# Patient Record
Sex: MALE | URBAN - METROPOLITAN AREA
[De-identification: names, ages, dates, MRNs, and addresses within clinical notes are randomized per-mention and may not be internally consistent; named-entity substitution may affect disease eponyms.]

---

## 2019-06-24 ENCOUNTER — TRANSFERRED RECORDS (OUTPATIENT)
Dept: HEALTH INFORMATION MANAGEMENT | Facility: CLINIC | Age: 54
End: 2019-06-24

## 2019-07-03 ENCOUNTER — APPOINTMENT (OUTPATIENT)
Age: 54
Setting detail: DERMATOLOGY
End: 2019-07-03

## 2019-07-03 VITALS — RESPIRATION RATE: 16 BRPM | WEIGHT: 315 LBS | HEIGHT: 60 IN

## 2019-07-03 DIAGNOSIS — L30.4 ERYTHEMA INTERTRIGO: ICD-10-CM

## 2019-07-03 DIAGNOSIS — L83 ACANTHOSIS NIGRICANS: ICD-10-CM

## 2019-07-03 DIAGNOSIS — L74.510 PRIMARY FOCAL HYPERHIDROSIS, AXILLA: ICD-10-CM

## 2019-07-03 PROCEDURE — 99202 OFFICE O/P NEW SF 15 MIN: CPT

## 2019-07-03 PROCEDURE — OTHER PRESCRIPTION: OTHER

## 2019-07-03 PROCEDURE — OTHER COUNSELING: OTHER

## 2019-07-03 RX ORDER — NYSTATIN 100000 1/G
POWDER TOPICAL QD-BID
Qty: 2 | Refills: 11 | Status: ERX | COMMUNITY
Start: 2019-07-03

## 2019-07-03 ASSESSMENT — LOCATION ZONE DERM: LOCATION ZONE: AXILLAE

## 2019-07-03 ASSESSMENT — LOCATION SIMPLE DESCRIPTION DERM
LOCATION SIMPLE: RIGHT AXILLARY VAULT
LOCATION SIMPLE: LEFT AXILLARY VAULT

## 2019-07-03 ASSESSMENT — LOCATION DETAILED DESCRIPTION DERM
LOCATION DETAILED: LEFT AXILLARY VAULT
LOCATION DETAILED: RIGHT AXILLARY VAULT

## 2019-07-03 NOTE — HPI: SWEATING (HYPERHIDROSIS)
Is This A New Presentation, Or A Follow-Up?: Sweating
Additional History: Started getting a rash. Was prescribed a cream for rash that did not help. Desatin did not help. The rash comes and does.  Rash started coming back 2 weeks ago but the sweating problem has been constant. Any deodorants cause rash. Uses pantene shampoo and dove body soap.

## 2019-07-03 NOTE — PROCEDURE: COUNSELING
Patient Specific Counseling (Will Not Stick From Patient To Patient): Patient has intertrigo in excellent as well, so we will not be recommending drysol. Also since he Has not already over the counter and a prosperous, this is another reason that we are not using drysol as he has not tolerated over the counter antiperspirants, this is another reason that we are not using Drysol. Patient denies any history of renal impairment. Recommended tapering up a course of Robinul (glycopyrrolate) but this has a severe interaction with his potassium supplement. As a result we will use the nystatin powder solo therapy. Also, controlling the hyperhidrosis will likely help his intertrigo. If not doing excellent within 1 month then return to clinic and would discussed hyperhidrosis botox injections. Patient expressed understanding.
Detail Level: Detailed
Medical Necessity Information: LCD Guidelines vary from payer to payer. Please check with your payer's policy to determine medical necessity.
Patient Specific Counseling (Will Not Stick From Patient To Patient): Recommended nystatin powder bid until resolved then qd for maintenance.
Patient Specific Counseling (Will Not Stick From Patient To Patient): Patient reports a history of prediabetes. Will not target this today so as to not complicate the intertrigo and hyperhidrosis problem.

## 2019-08-01 ENCOUNTER — HOSPITAL ENCOUNTER (OUTPATIENT)
Dept: OCCUPATIONAL THERAPY | Facility: CLINIC | Age: 54
Discharge: HOME | End: 2019-08-01
Attending: FAMILY MEDICINE | Admitting: FAMILY MEDICINE
Payer: COMMERCIAL

## 2019-08-01 PROCEDURE — 97535 SELF CARE MNGMENT TRAINING: CPT | Mod: GO | Performed by: OCCUPATIONAL THERAPIST

## 2019-08-01 PROCEDURE — 97166 OT EVAL MOD COMPLEX 45 MIN: CPT | Mod: GO | Performed by: OCCUPATIONAL THERAPIST

## 2019-08-01 NOTE — PROGRESS NOTES
[facility]        OUTPATIENT OCCUPATIONAL THERAPY EDEMA EVALUATION  PLAN OF TREATMENT FOR OUTPATIENT REHABILITATION  (COMPLETE FOR INITIAL CLAIMS ONLY)  Patient's Last Name, First Name, Kavon Santacruz                           Provider s Name:   [facility] Medical Record No.  [id number]     Start of Care Date:  08/01/19   Onset Date:  01/01/13   Type:  OT   Medical Diagnosis:  BLE lymphedema   Therapy Diagnosis:  BLE lymphedema Visits from SOC:  1                                     __________________________________________________________________________________   Plan of Treatment/Functional Goals:    Manual lymph drainage, Gradient compression bandaging, Fit for compression garment, Exercises, Precautions to prevent infection / exacerbation, Education, Manual therapy, ADL training, Skin care / precautions, Home management program development        GOALS  1. Goal description: Pt/caregiver will demonstrate IND with home program including: GCB to BLEs , self MLD and increasing exercise to 4x/wk to reduce edema to improve skin integrity, prevent infection and to be fit for compression garments for edema management at discharge.       Target date: 09/19/19  2. Goal description: Pt will demonstrate a 400-500mL reduction in volume in each RLE and LLE to prevent infection, improve skin integrity and to be fit for compression garments for edema management at discharge.       Target date: 09/19/19  3. Goal description: Pt will be IND with donning compression garment(s) and verbalizing wear/wash/replace schedule for edema management at discharge.        Target date: 09/19/19  4. Goal description: Pt will, independently, verbalize the signs/symptoms of lymphedema, precautions and how to obtain a future lymphedema referral if edema presents to preserve skin  integrity, prevent infection and preserve functional mobility.          Target date: 09/19/19  5.            6.               7.             8.              Treatment Frequency: (3x/wk x1wk, 2x/wk x3wks, 1x/wk x3wks)   Treatment duration: x7 weeks    Claudia Berry OT                                    I CERTIFY THE NEED FOR THESE SERVICES FURNISHED UNDER        THIS PLAN OF TREATMENT AND WHILE UNDER MY CARE .             Physician Signature               Date    X_____________________________________________________                        Certification date from: 08/01/19       Certification date to: 09/19/19           Referring physician: Liliana Rico MD   Initial Assessment  See Epic Evaluation- Start of care: 08/01/19

## 2019-08-05 ENCOUNTER — HOSPITAL ENCOUNTER (OUTPATIENT)
Dept: OCCUPATIONAL THERAPY | Facility: CLINIC | Age: 54
Setting detail: THERAPIES SERIES
End: 2019-08-05
Attending: FAMILY MEDICINE
Payer: COMMERCIAL

## 2019-08-05 PROCEDURE — 97535 SELF CARE MNGMENT TRAINING: CPT | Mod: GO | Performed by: OCCUPATIONAL THERAPIST

## 2019-08-07 ENCOUNTER — HOSPITAL ENCOUNTER (OUTPATIENT)
Dept: OCCUPATIONAL THERAPY | Facility: CLINIC | Age: 54
Setting detail: THERAPIES SERIES
End: 2019-08-07
Attending: FAMILY MEDICINE
Payer: COMMERCIAL

## 2019-08-07 PROCEDURE — 97535 SELF CARE MNGMENT TRAINING: CPT | Mod: GO | Performed by: OCCUPATIONAL THERAPIST

## 2019-08-09 ENCOUNTER — HOSPITAL ENCOUNTER (OUTPATIENT)
Dept: OCCUPATIONAL THERAPY | Facility: CLINIC | Age: 54
Setting detail: THERAPIES SERIES
End: 2019-08-09
Attending: FAMILY MEDICINE
Payer: COMMERCIAL

## 2019-08-09 PROCEDURE — 97535 SELF CARE MNGMENT TRAINING: CPT | Mod: GO | Performed by: OCCUPATIONAL THERAPIST

## 2019-08-14 ENCOUNTER — HOSPITAL ENCOUNTER (OUTPATIENT)
Dept: OCCUPATIONAL THERAPY | Facility: CLINIC | Age: 54
Setting detail: THERAPIES SERIES
End: 2019-08-14
Attending: FAMILY MEDICINE
Payer: COMMERCIAL

## 2019-08-14 PROCEDURE — 97535 SELF CARE MNGMENT TRAINING: CPT | Mod: GO | Performed by: OCCUPATIONAL THERAPIST

## 2019-08-16 ENCOUNTER — HOSPITAL ENCOUNTER (OUTPATIENT)
Dept: OCCUPATIONAL THERAPY | Facility: CLINIC | Age: 54
Setting detail: THERAPIES SERIES
End: 2019-08-16
Attending: FAMILY MEDICINE
Payer: COMMERCIAL

## 2019-08-16 PROCEDURE — 97140 MANUAL THERAPY 1/> REGIONS: CPT | Mod: GO | Performed by: OCCUPATIONAL THERAPIST

## 2019-08-16 PROCEDURE — 97535 SELF CARE MNGMENT TRAINING: CPT | Mod: GO | Performed by: OCCUPATIONAL THERAPIST

## 2019-08-19 ENCOUNTER — HOSPITAL ENCOUNTER (OUTPATIENT)
Dept: OCCUPATIONAL THERAPY | Facility: CLINIC | Age: 54
Setting detail: THERAPIES SERIES
End: 2019-08-19
Attending: FAMILY MEDICINE
Payer: COMMERCIAL

## 2019-08-19 PROCEDURE — 97535 SELF CARE MNGMENT TRAINING: CPT | Mod: GO | Performed by: OCCUPATIONAL THERAPIST

## 2019-08-21 ENCOUNTER — HOSPITAL ENCOUNTER (OUTPATIENT)
Dept: OCCUPATIONAL THERAPY | Facility: CLINIC | Age: 54
Setting detail: THERAPIES SERIES
End: 2019-08-21
Attending: FAMILY MEDICINE
Payer: COMMERCIAL

## 2019-08-21 PROCEDURE — 97535 SELF CARE MNGMENT TRAINING: CPT | Mod: GO | Performed by: OCCUPATIONAL THERAPIST

## 2019-08-21 PROCEDURE — 97140 MANUAL THERAPY 1/> REGIONS: CPT | Mod: GO | Performed by: OCCUPATIONAL THERAPIST

## 2019-08-22 ENCOUNTER — APPOINTMENT (OUTPATIENT)
Age: 54
Setting detail: DERMATOLOGY
End: 2019-08-22

## 2019-08-22 VITALS — RESPIRATION RATE: 17 BRPM | WEIGHT: 315 LBS | HEIGHT: 66 IN

## 2019-08-22 DIAGNOSIS — L30.4 ERYTHEMA INTERTRIGO: ICD-10-CM

## 2019-08-22 PROCEDURE — 99212 OFFICE O/P EST SF 10 MIN: CPT

## 2019-08-22 PROCEDURE — OTHER COUNSELING: OTHER

## 2019-08-22 ASSESSMENT — LOCATION ZONE DERM: LOCATION ZONE: AXILLAE

## 2019-08-22 ASSESSMENT — LOCATION DETAILED DESCRIPTION DERM
LOCATION DETAILED: LEFT AXILLARY VAULT
LOCATION DETAILED: RIGHT AXILLARY VAULT

## 2019-08-22 ASSESSMENT — LOCATION SIMPLE DESCRIPTION DERM
LOCATION SIMPLE: RIGHT AXILLARY VAULT
LOCATION SIMPLE: LEFT AXILLARY VAULT

## 2019-08-22 NOTE — PROCEDURE: COUNSELING
Patient Specific Counseling (Will Not Stick From Patient To Patient): Discussed recommendations to continue the over-the-counter antiperspirant for the hyperhidrosis but he can discontinue the nystatin powder. Should he reflare then restart nystatin. Patient expressed understanding.
Detail Level: Detailed

## 2019-08-22 NOTE — HPI: RASH
Is This A New Presentation, Or A Follow-Up?: Follow Up Rash
Additional History: Still using nystatin powder daily for maintenance, he reports the rash is resolved and sweating is resolved as well. Also has been using an over-the-counter antiperpirant war that helps.

## 2019-08-28 ENCOUNTER — HOSPITAL ENCOUNTER (OUTPATIENT)
Dept: OCCUPATIONAL THERAPY | Facility: CLINIC | Age: 54
Setting detail: THERAPIES SERIES
End: 2019-08-28
Attending: FAMILY MEDICINE
Payer: COMMERCIAL

## 2019-08-28 PROCEDURE — 97535 SELF CARE MNGMENT TRAINING: CPT | Mod: GO | Performed by: OCCUPATIONAL THERAPIST

## 2019-08-28 PROCEDURE — 97140 MANUAL THERAPY 1/> REGIONS: CPT | Mod: GO | Performed by: OCCUPATIONAL THERAPIST

## 2019-09-19 ENCOUNTER — HOSPITAL ENCOUNTER (OUTPATIENT)
Dept: OCCUPATIONAL THERAPY | Facility: CLINIC | Age: 54
Setting detail: THERAPIES SERIES
End: 2019-09-19
Attending: FAMILY MEDICINE
Payer: COMMERCIAL

## 2019-09-19 PROCEDURE — 97535 SELF CARE MNGMENT TRAINING: CPT | Mod: GO | Performed by: OCCUPATIONAL THERAPIST

## 2019-09-19 NOTE — PROGRESS NOTES
Outpatient Occupational Therapy Discharge Note     Patient: Kavon Garsia  : 1965    Beginning/End Dates of Reporting Period:  19 to 2019    Referring Provider: Liliana Rico MD    Therapy Diagnosis: BLE lymphedema    Client Self Report:   0    Objective Measurements:     Objective Measure: skin assessment   Details: 19:  Bilateral toes and feet with grey-colored scaley, calloused appearance and on midline of left lower leg in a vertical strip from ankle to knee crease but this is getting smaller. RLE with mild, non-pitting edema of dorsal foot and ankle, minimal edema from above ankle to knee crease, trace edema of knee and thigh.  LLE with mild edema of foot, minimal edema from ankle to knee crease, trace edema of knee and thigh.  LLE with less obvious circumferential indentation at midshin due to edema reduction.    Objective Measure: volume   Details: Fron 19: RLE-430mL and LLE -115mL          Goals:   Goal Identifier home program   Goal Description Pt/caregiver will demonstrate IND with home program including: GCB to BLEs , self MLD and increasing exercise to 4x/wk to reduce edema to improve skin integrity, prevent infection and to be fit for compression garments for edema management at discharge.   Target Date 19   Date Met  19   Progress:     Goal Identifier volume   Goal Description Pt will demonstrate a 400-500mL reduction in volume in each RLE and LLE to prevent infection, improve skin integrity and to be fit for compression garments for edema management at discharge.   Target Date 19   Date Met  (partially met: RLE -870mL)   Progress:     Goal Identifier compression   Goal Description Pt will be IND with donning compression garment(s) and verbalizing wear/wash/replace schedule for edema management at discharge.    Target Date 19   Date Met  19   Progress:     Goal Identifier signs/symtpoms   Goal Description Pt will, independently, verbalize the  signs/symptoms of lymphedema, precautions and how to obtain a future lymphedema referral if edema presents to preserve skin integrity, prevent infection and preserve functional mobility.      Target Date 09/19/19   Date Met  09/19/19   Progress:     Progress Toward Goals:   Pt demonstrated a 430mL reduction in RLE volume and 115mL reduction in LLE volume.  Skin of BLEs is softer, less fibrotic texture.       Plan:  Discharge from therapy.    Discharge:    Reason for Discharge: Patient has met all goals.    Equipment Issued: GCB materials    Discharge Plan: Patient to continue home program: Wear of CompreFit Plus Universal velcro garments during day hours and overnight (pt has two sets: one for day and one for night), exercise 4 days/week.

## 2020-01-29 ENCOUNTER — APPOINTMENT (OUTPATIENT)
Age: 55
Setting detail: DERMATOLOGY
End: 2020-01-29

## 2020-01-29 DIAGNOSIS — L91.8 OTHER HYPERTROPHIC DISORDERS OF THE SKIN: ICD-10-CM

## 2020-01-29 DIAGNOSIS — R20.8 OTHER DISTURBANCES OF SKIN SENSATION: ICD-10-CM

## 2020-01-29 DIAGNOSIS — L82.1 OTHER SEBORRHEIC KERATOSIS: ICD-10-CM

## 2020-01-29 PROCEDURE — OTHER BENIGN DESTRUCTION: OTHER

## 2020-01-29 PROCEDURE — OTHER COUNSELING: OTHER

## 2020-01-29 PROCEDURE — 99203 OFFICE O/P NEW LOW 30 MIN: CPT

## 2020-01-29 ASSESSMENT — LOCATION DETAILED DESCRIPTION DERM
LOCATION DETAILED: LEFT AXILLARY VAULT
LOCATION DETAILED: SUPERIOR MID FOREHEAD
LOCATION DETAILED: RIGHT AXILLARY VAULT
LOCATION DETAILED: STERNUM
LOCATION DETAILED: LEFT ANTERIOR AXILLA
LOCATION DETAILED: LEFT RIB CAGE
LOCATION DETAILED: LEFT MEDIAL SUPERIOR EYELID

## 2020-01-29 ASSESSMENT — LOCATION ZONE DERM
LOCATION ZONE: FACE
LOCATION ZONE: TRUNK
LOCATION ZONE: AXILLAE
LOCATION ZONE: EYELID

## 2020-01-29 ASSESSMENT — LOCATION SIMPLE DESCRIPTION DERM
LOCATION SIMPLE: LEFT ANTERIOR AXILLA
LOCATION SIMPLE: RIGHT AXILLARY VAULT
LOCATION SIMPLE: CHEST
LOCATION SIMPLE: LEFT SUPERIOR EYELID
LOCATION SIMPLE: ABDOMEN
LOCATION SIMPLE: LEFT AXILLARY VAULT
LOCATION SIMPLE: SUPERIOR FOREHEAD

## 2020-01-29 NOTE — HPI: SKIN LESION (SKIN TAGS)
How Severe Are They?: mild
Is This A New Presentation, Or A Follow-Up?: Skin Lesions
Additional History: Patient requests removal today.

## 2020-01-29 NOTE — PROCEDURE: COUNSELING
Patient Specific Counseling (Will Not Stick From Patient To Patient): See plan above.
Detail Level: Zone
Detail Level: Generalized
Detail Level: Simple

## 2020-01-29 NOTE — PROCEDURE: BENIGN DESTRUCTION
Medical Necessity Clause: This procedure was medically necessary because the lesions that were treated were:
Render Note In Bullet Format When Appropriate: No
Anesthesia Volume In Cc: 0.5
Detail Level: Zone
Medical Necessity Information: It is in your best interest to select a reason for this procedure from the list below. All of these items fulfill various CMS LCD requirements except the new and changing color options.
Post-Care Instructions: I reviewed with the patient in detail post-care instructions. Patient is to wear sunprotection, and avoid picking at any of the treated lesions. Pt may apply Vaseline to crusted or scabbing areas.
Consent: The patient's consent was obtained including but not limited to risks of crusting, scabbing, blistering, scarring, darker or lighter pigmentary change, recurrence, incomplete removal and infection.
Total Number Of Lesions Treated: 14

## 2020-02-12 ENCOUNTER — APPOINTMENT (OUTPATIENT)
Age: 55
Setting detail: DERMATOLOGY
End: 2020-02-12

## 2020-02-12 DIAGNOSIS — L91.8 OTHER HYPERTROPHIC DISORDERS OF THE SKIN: ICD-10-CM

## 2020-02-12 DIAGNOSIS — R20.8 OTHER DISTURBANCES OF SKIN SENSATION: ICD-10-CM

## 2020-02-12 DIAGNOSIS — D49.2 NEOPLASM OF UNSPECIFIED BEHAVIOR OF BONE, SOFT TISSUE, AND SKIN: ICD-10-CM

## 2020-02-12 PROCEDURE — 11200 RMVL SKIN TAGS UP TO&INC 15: CPT

## 2020-02-12 PROCEDURE — OTHER SHAVE REMOVAL: OTHER

## 2020-02-12 PROCEDURE — 99213 OFFICE O/P EST LOW 20 MIN: CPT | Mod: 25

## 2020-02-12 PROCEDURE — OTHER COUNSELING: OTHER

## 2020-02-12 PROCEDURE — OTHER SKIN TAG REMOVAL: OTHER

## 2020-02-12 PROCEDURE — 11301 SHAVE SKIN LESION 0.6-1.0 CM: CPT | Mod: 59

## 2020-02-12 ASSESSMENT — LOCATION DETAILED DESCRIPTION DERM
LOCATION DETAILED: LEFT CENTRAL EYEBROW
LOCATION DETAILED: LEFT CLAVICULAR SKIN
LOCATION DETAILED: RIGHT CLAVICULAR SKIN
LOCATION DETAILED: RIGHT POSTERIOR AXILLA
LOCATION DETAILED: LEFT AXILLARY VAULT
LOCATION DETAILED: RIGHT ANTERIOR PROXIMAL UPPER ARM
LOCATION DETAILED: RIGHT AXILLARY VAULT
LOCATION DETAILED: RIGHT MEDIAL SUPERIOR CHEST

## 2020-02-12 ASSESSMENT — LOCATION ZONE DERM
LOCATION ZONE: ARM
LOCATION ZONE: FACE
LOCATION ZONE: TRUNK
LOCATION ZONE: AXILLAE

## 2020-02-12 ASSESSMENT — LOCATION SIMPLE DESCRIPTION DERM
LOCATION SIMPLE: RIGHT POSTERIOR AXILLA
LOCATION SIMPLE: LEFT EYEBROW
LOCATION SIMPLE: RIGHT CLAVICULAR SKIN
LOCATION SIMPLE: RIGHT UPPER ARM
LOCATION SIMPLE: LEFT AXILLARY VAULT
LOCATION SIMPLE: CHEST
LOCATION SIMPLE: LEFT CLAVICULAR SKIN
LOCATION SIMPLE: RIGHT AXILLARY VAULT

## 2020-02-12 NOTE — PROCEDURE: SHAVE REMOVAL
Bill For Surgical Tray: no
Hemostasis: Drysol
Path Notes (To The Dermatopathologist): Please check margins.
Medical Necessity Information: It is in your best interest to select a reason for this procedure from the list below. All of these items fulfill various CMS LCD requirements except the new and changing color options.
Notification Instructions: Patient will be notified of biopsy results. However, patient instructed to call the office if not contacted within 2 weeks.
Body Location Override (Optional - Billing Will Still Be Based On Selected Body Map Location If Applicable): left axillary vault
Was A Bandage Applied: Yes
Medical Necessity Clause: This procedure was medically necessary because the lesion that was treated was:
Post-Care Instructions: I reviewed with the patient in detail post-care instructions. Patient is to keep the biopsy site dry overnight, and then apply bacitracin twice daily until healed. Patient may apply hydrogen peroxide soaks to remove any crusting.
X Size Of Lesion In Cm (Optional): 0
Billing Type: Third-Party Bill
Anesthesia Type: 1% lidocaine with epinephrine
Biopsy Method: Dermablade
Consent was obtained from the patient. The risks and benefits to therapy were discussed in detail. Specifically, the risks of infection, scarring, bleeding, prolonged wound healing, incomplete removal, allergy to anesthesia, nerve injury and recurrence were addressed. Prior to the procedure, the treatment site was clearly identified and confirmed by the patient. All components of Universal Protocol/PAUSE Rule completed.
Size Of Lesion In Cm (Required): 0.8
Detail Level: Simple
Wound Care: Petrolatum

## 2020-02-12 NOTE — PROCEDURE: COUNSELING
Detail Level: Zone
Detail Level: Generalized
Patient Specific Counseling (Will Not Stick From Patient To Patient): See plan above.

## 2020-02-12 NOTE — PROCEDURE: SKIN TAG REMOVAL
Include Z78.9 (Other Specified Conditions Influencing Health Status) As An Associated Diagnosis?: No
Anesthesia Volume In Cc: 3
Hemostasis: Aluminum Chloride
Consent: Verbal consent obtained and the risks of skin tag removal was reviewed with the patient including but not limited to bleeding, pigmentary change, infection, pain, and remote possibility of scarring.
Anesthesia Type: 1% lidocaine with epinephrine
Medical Necessity Clause: This procedure was medically necessary because the lesions that were treated were:
Add Associated Diagnoses If Applicable When Selecting Medical Necessity: Yes
Medical Necessity Information: It is in your best interest to select a reason for this procedure from the list below. All of these items fulfill various CMS LCD requirements except the new and changing color options.
Detail Level: Zone

## 2020-03-11 ENCOUNTER — APPOINTMENT (OUTPATIENT)
Age: 55
Setting detail: DERMATOLOGY
End: 2020-03-11

## 2020-03-11 DIAGNOSIS — L74.510 PRIMARY FOCAL HYPERHIDROSIS, AXILLA: ICD-10-CM

## 2020-03-11 DIAGNOSIS — L91.8 OTHER HYPERTROPHIC DISORDERS OF THE SKIN: ICD-10-CM

## 2020-03-11 PROCEDURE — 99213 OFFICE O/P EST LOW 20 MIN: CPT | Mod: 25

## 2020-03-11 PROCEDURE — OTHER PRESCRIPTION: OTHER

## 2020-03-11 PROCEDURE — OTHER COUNSELING: OTHER

## 2020-03-11 PROCEDURE — OTHER SKIN TAG REMOVAL MULTI: OTHER

## 2020-03-11 PROCEDURE — 11200 RMVL SKIN TAGS UP TO&INC 15: CPT

## 2020-03-11 RX ORDER — ALUMINUM CHLORIDE 20 %
SOLUTION, NON-ORAL TOPICAL QHS
Qty: 1 | Refills: 3 | Status: ERX | COMMUNITY
Start: 2020-03-11

## 2020-03-11 ASSESSMENT — LOCATION DETAILED DESCRIPTION DERM
LOCATION DETAILED: LEFT AXILLARY VAULT
LOCATION DETAILED: RIGHT AXILLARY VAULT

## 2020-03-11 ASSESSMENT — LOCATION SIMPLE DESCRIPTION DERM
LOCATION SIMPLE: RIGHT AXILLARY VAULT
LOCATION SIMPLE: LEFT AXILLARY VAULT

## 2020-03-11 ASSESSMENT — LOCATION ZONE DERM: LOCATION ZONE: AXILLAE

## 2020-03-11 NOTE — PROCEDURE: COUNSELING
Detail Level: Simple
Medical Necessity Information: LCD Guidelines vary from payer to payer. Please check with your payer's policy to determine medical necessity.
Medical Necessity Clause: Botulinum toxin hyperhidrosis therapy is medically necessary because
Detail Level: Detailed

## 2020-03-11 NOTE — PROCEDURE: SKIN TAG REMOVAL MULTI
Detail Level: Detailed
Consent: Written consent obtained and the risks of skin tag removal was reviewed with the patient including but not limited to bleeding, pigmentary change, infection, pain, and remote possibility of scarring.
Medical Necessity Information: It is in your best interest to select a reason for this procedure from the list below. All of these items fulfill various CMS LCD requirements except the new and changing color options.
Add Associated Diagnoses If Applicable When Selecting Medical Necessity: Yes
Include Z78.9 (Other Specified Conditions Influencing Health Status) As An Associated Diagnosis?: No
Anesthesia Type: 1% lidocaine with epinephrine
Total Number Of Lesions Treated: 4
Medical Necessity Clause: This procedure was medically necessary because the lesions that were treated were:
Anesthesia Volume In Cc: 3

## 2022-01-01 NOTE — PROGRESS NOTES
"   08/01/19 1333   Quick Adds   Quick Adds Certification   Rehab Discipline   Discipline OT   Type of Visit   Type of visit Initial Edema Evaluation       present Yes   Language   (Alka Prather)   General Information   Start of care 08/01/19   Referring physician Liliana Rico MD   Orders Evaluate and treat as indicated   Order date 06/24/19   Medical diagnosis H/o obesity, chronic BLE edema.  Pt presents with BLE edema.   Onset of illness / date of surgery 01/01/13   Edema onset 01/01/13   Affected body parts LLE;RLE   Edema etiology comments likely due to obesity, sedentary lifestyle   Pertinent history of current problem (PT: include personal factors and/or comorbidities that impact the POC; OT: include additional occupational profile info) Pt does wear 30-40mmHg comrpession stockings and takes a diuretic. Pt reported exercising at the [facility] 2x/wk x60 minutes   Surgical / medical history reviewed Yes   Prior level of functional mobility IND   Prior treatment Compression garments;Diuretics  (30-40mmHg)   Community support Family / friend caregiver  (wife and 2 kids)   Patient role / employment history Employed  ([employer] part-time)   Living environment comments difficulty going up/down stairs, getting in/out of cars and dressing lower body   System Outcome Measures   Outcome Measures Lymphedema   Lymphedema Life Impact Scale (score range 0-72). A higher score indicates greater impairment.   (did not fill out due to language barriers)   Subjective Report   Patient report of symptoms swelling in legs   Patient / Family Goals   Patient / family goals statement to reduce size of legs   Pain   Pain comments \"burning sensation\" in BLEs but more in LLE   Cognitive Status   Orientation Orientation to person, place and time   Level of consciousness Alert   Follows commands and answers questions 100% of the time   Edema Exam / Assessment   Skin condition comments Bilateral toes and feet with scaley, ashy " appearance, firm, 3+ pitting from MTP joints to knee creases, darker in color from ankles to midshins.  Mild edema of knees and thighs   Scar No   Ulceration No   Girth Measurements   Girth Measurements Refer to separate girth measurement flowsheet   Posture   Posture Normal   Activities of Daily Living   Activities of Daily Living IND but difficult to joseph/doff clothing/shoes on lower body   Bed Mobility   Bed mobility IND   Transfers   Transfers IND   Gait / Locomotion   Gait / Locomotion IND   Sensory   Sensory perception comments neuropathy in bilateral feet   Planned Edema Interventions   Planned edema interventions Manual lymph drainage;Gradient compression bandaging;Fit for compression garment;Exercises;Precautions to prevent infection / exacerbation;Education;Manual therapy;ADL training;Skin care / precautions;Home management program development   Clinical Impression   Criteria for skilled therapeutic intervention met Yes   Therapy diagnosis BLE lymphedema   Influenced by the following impairments / conditions Stage 2   Assessment of Occupational Performance 3-5 Performance Deficits   Identified Performance Deficits at risk for infection, at risk for progression of lymphedema, firm skin, diffculty with stairs and getting in/out of cars   Clinical Decision Making (Complexity) Moderate complexity   Treatment Frequency   (3x/wk x1wk, 2x/wk x3wks, 1x/wk x3wks)   Treatment duration x7 weeks   Patient / family and/or staff in agreement with plan of care Yes   Risks and benefits of therapy have been explained Yes   Clinical impression comments Pt presents with chronic BLE edema with firm skin and is at risk for progression of lymphedema and infection if it is not addressed. Pt will benefit from continued skilled OT intervention to reduce volume in BLEs and to be fit for compression garments for edema management at discharge.    Goals   Edema Eval Goals 1;2;3;4   Goal 1   Goal identifier home program   Goal  description Pt/caregiver will demonstrate IND with home program including: GCB to BLEs , self MLD and increasing exercise to 4x/wk to reduce edema to improve skin integrity, prevent infection and to be fit for compression garments for edema management at discharge.   Target date 09/19/19   Goal 2   Goal identifier volume   Goal description Pt will demonstrate a 400-500mL reduction in volume in each RLE and LLE to prevent infection, improve skin integrity and to be fit for compression garments for edema management at discharge.   Target date 09/19/19   Goal 3   Goal identifier compression   Goal description Pt will be IND with donning compression garment(s) and verbalizing wear/wash/replace schedule for edema management at discharge.    Target date 09/19/19   Goal 4   Goal identifier signs/symtpoms   Goal description Pt will, independently, verbalize the signs/symptoms of lymphedema, precautions and how to obtain a future lymphedema referral if edema presents to preserve skin integrity, prevent infection and preserve functional mobility.      Target date 09/19/19   Total Evaluation Time   OT Eval, Moderate Complexity Minutes (29302) 15   Certification   Certification date from 08/01/19   Certification date to 09/19/19   Medical Diagnosis BLE lymphedema      [Parents] : parents